# Patient Record
(demographics unavailable — no encounter records)

---

## 2025-03-16 NOTE — REVIEW OF SYSTEMS
[Negative] : Heme/Lymph [FreeTextEntry4] : Rhinitis, occasional snoring [FreeTextEntry8] : rare urinary leakage [FreeTextEntry9] : Right foot pain [de-identified] : Paresthesia LES- saw neurologist normal exam

## 2025-03-16 NOTE — PHYSICAL EXAM
[No Acute Distress] : no acute distress [Well Nourished] : well nourished [Well Developed] : well developed [Well-Appearing] : well-appearing [Normal Sclera/Conjunctiva] : normal sclera/conjunctiva [PERRL] : pupils equal round and reactive to light [EOMI] : extraocular movements intact [Normal Outer Ear/Nose] : the outer ears and nose were normal in appearance [Normal Oropharynx] : the oropharynx was normal [No JVD] : no jugular venous distention [No Lymphadenopathy] : no lymphadenopathy [Supple] : supple [Thyroid Normal, No Nodules] : the thyroid was normal and there were no nodules present [No Respiratory Distress] : no respiratory distress  [No Accessory Muscle Use] : no accessory muscle use [Clear to Auscultation] : lungs were clear to auscultation bilaterally [Normal Rate] : normal rate  [Regular Rhythm] : with a regular rhythm [Normal S1, S2] : normal S1 and S2 [No Murmur] : no murmur heard [No Carotid Bruits] : no carotid bruits [No Abdominal Bruit] : a ~M bruit was not heard ~T in the abdomen [Pedal Pulses Present] : the pedal pulses are present [No Varicosities] : no varicosities [No Edema] : there was no peripheral edema [No Palpable Aorta] : no palpable aorta [No Extremity Clubbing/Cyanosis] : no extremity clubbing/cyanosis [Non Tender] : non-tender [Soft] : abdomen soft [Non-distended] : non-distended [No Masses] : no abdominal mass palpated [No HSM] : no HSM [Normal Bowel Sounds] : normal bowel sounds [Normal Posterior Cervical Nodes] : no posterior cervical lymphadenopathy [Normal Anterior Cervical Nodes] : no anterior cervical lymphadenopathy [No CVA Tenderness] : no CVA  tenderness [No Spinal Tenderness] : no spinal tenderness [No Joint Swelling] : no joint swelling [Grossly Normal Strength/Tone] : grossly normal strength/tone [No Rash] : no rash [Coordination Grossly Intact] : coordination grossly intact [No Focal Deficits] : no focal deficits [Normal Gait] : normal gait [Normal Affect] : the affect was normal [Deep Tendon Reflexes (DTR)] : deep tendon reflexes were 2+ and symmetric [Normal Insight/Judgement] : insight and judgment were intact [de-identified] : Normotensive   BMI: 26.61   [de-identified] : Nasal rhinitis

## 2025-03-16 NOTE — HISTORY OF PRESENT ILLNESS
[FreeTextEntry1] : Annual Wellness examination [de-identified] : 49 yrs old female here for annual wellness examination.  New history : Fibroids -pelvic, recent ovarian cyst resolved. Patient went for genetic testing regarding FH renal cell carcinoma- father  at 62 yrs old.  Genetic test was negative. PSH: 1990s Tonsillectomy 2018 Myomectomy PMH: Bradycardia  bilateral breast biopsy negative for cancer Gallstones-states resolved Hx gluten sensitivity 12/15/23: Surrogate pregnancy - healthy baby daughter Allergies: NKDA Seasonal allergies Medication: No prescriptions Vitamins include: No vitamins SH: Nonsmoker, No marijuana. Alcohol social- wine at dinner almost daily FH: Mother: New- Alzheimer's dementia. 81.  Father  renal cell carcinoma 62 Paternal. GM breast cancer(65)  92 Nutrition: Diet; regular Exercise: Less this winter. Running- plan increase exercise- cardio  Preventive screening:   Breast imaging : 2024 U/S - Mammogram normal  Biopsy   left breast neg for cancer.  Biopsy  neg for cancer right breast. GYN/pelvic imagin2024 normal +Fibroids-small Cyst on ovary cleared.   Bone density: Not yet   CRC screenin colonoscopy- normal Dr Dent   Ophthalmology: Annual - normal +readers   Cardiac: EKG normal   Labwork: Today  Need for lung cancer screening: Smoker within 15 yrs of 20 +pack yrs/over 50: neg   Dermatology: 1 yrs ago skin check- no precancer   Dental: Every 3-6 mos   Fall risk: No    Advance directives:  not sure

## 2025-03-16 NOTE — HEALTH RISK ASSESSMENT
[Little interest or pleasure doing things] : 1) Little interest or pleasure doing things [Feeling down, depressed, or hopeless] : 2) Feeling down, depressed, or hopeless [0] : 2) Feeling down, depressed, or hopeless: Not at all (0) [PHQ-2 Negative - No further assessment needed] : PHQ-2 Negative - No further assessment needed [I have developed a follow-up plan documented below in the note.] : I have developed a follow-up plan documented below in the note. [Time Spent: ___ Minutes] : I spent [unfilled] minutes performing a depression screening for this patient. [[id number]] : 0

## 2025-03-16 NOTE — PHYSICAL EXAM
[No Acute Distress] : no acute distress [Well Nourished] : well nourished [Well Developed] : well developed [Well-Appearing] : well-appearing [Normal Sclera/Conjunctiva] : normal sclera/conjunctiva [PERRL] : pupils equal round and reactive to light [EOMI] : extraocular movements intact [Normal Outer Ear/Nose] : the outer ears and nose were normal in appearance [Normal Oropharynx] : the oropharynx was normal [No JVD] : no jugular venous distention [No Lymphadenopathy] : no lymphadenopathy [Supple] : supple [Thyroid Normal, No Nodules] : the thyroid was normal and there were no nodules present [No Respiratory Distress] : no respiratory distress  [No Accessory Muscle Use] : no accessory muscle use [Clear to Auscultation] : lungs were clear to auscultation bilaterally [Normal Rate] : normal rate  [Regular Rhythm] : with a regular rhythm [Normal S1, S2] : normal S1 and S2 [No Murmur] : no murmur heard [No Carotid Bruits] : no carotid bruits [No Abdominal Bruit] : a ~M bruit was not heard ~T in the abdomen [Pedal Pulses Present] : the pedal pulses are present [No Varicosities] : no varicosities [No Edema] : there was no peripheral edema [No Palpable Aorta] : no palpable aorta [No Extremity Clubbing/Cyanosis] : no extremity clubbing/cyanosis [Non Tender] : non-tender [Soft] : abdomen soft [Non-distended] : non-distended [No Masses] : no abdominal mass palpated [No HSM] : no HSM [Normal Bowel Sounds] : normal bowel sounds [Normal Posterior Cervical Nodes] : no posterior cervical lymphadenopathy [Normal Anterior Cervical Nodes] : no anterior cervical lymphadenopathy [No CVA Tenderness] : no CVA  tenderness [No Spinal Tenderness] : no spinal tenderness [No Joint Swelling] : no joint swelling [Grossly Normal Strength/Tone] : grossly normal strength/tone [No Rash] : no rash [Coordination Grossly Intact] : coordination grossly intact [No Focal Deficits] : no focal deficits [Normal Gait] : normal gait [Normal Affect] : the affect was normal [Deep Tendon Reflexes (DTR)] : deep tendon reflexes were 2+ and symmetric [Normal Insight/Judgement] : insight and judgment were intact [de-identified] : Normotensive   BMI: 26.61   [de-identified] : Nasal rhinitis

## 2025-03-16 NOTE — HEALTH RISK ASSESSMENT
[Little interest or pleasure doing things] : 1) Little interest or pleasure doing things [Feeling down, depressed, or hopeless] : 2) Feeling down, depressed, or hopeless [0] : 2) Feeling down, depressed, or hopeless: Not at all (0) [PHQ-2 Negative - No further assessment needed] : PHQ-2 Negative - No further assessment needed [I have developed a follow-up plan documented below in the note.] : I have developed a follow-up plan documented below in the note. [Time Spent: ___ Minutes] : I spent [unfilled] minutes performing a depression screening for this patient. [[id number]] : 0

## 2025-03-16 NOTE — HISTORY OF PRESENT ILLNESS
[FreeTextEntry1] : Annual Wellness examination [de-identified] : 49 yrs old female here for annual wellness examination.  New history : Fibroids -pelvic, recent ovarian cyst resolved. Patient went for genetic testing regarding FH renal cell carcinoma- father  at 62 yrs old.  Genetic test was negative. PSH: 1990s Tonsillectomy 2018 Myomectomy PMH: Bradycardia  bilateral breast biopsy negative for cancer Gallstones-states resolved Hx gluten sensitivity 12/15/23: Surrogate pregnancy - healthy baby daughter Allergies: NKDA Seasonal allergies Medication: No prescriptions Vitamins include: No vitamins SH: Nonsmoker, No marijuana. Alcohol social- wine at dinner almost daily FH: Mother: New- Alzheimer's dementia. 81.  Father  renal cell carcinoma 62 Paternal. GM breast cancer(65)  92 Nutrition: Diet; regular Exercise: Less this winter. Running- plan increase exercise- cardio  Preventive screening:   Breast imaging : 2024 U/S - Mammogram normal  Biopsy   left breast neg for cancer.  Biopsy  neg for cancer right breast. GYN/pelvic imagin2024 normal +Fibroids-small Cyst on ovary cleared.   Bone density: Not yet   CRC screenin colonoscopy- normal Dr Dent   Ophthalmology: Annual - normal +readers   Cardiac: EKG normal   Labwork: Today  Need for lung cancer screening: Smoker within 15 yrs of 20 +pack yrs/over 50: neg   Dermatology: 1 yrs ago skin check- no precancer   Dental: Every 3-6 mos   Fall risk: No    Advance directives:  not sure

## 2025-03-16 NOTE — PLAN
[FreeTextEntry1] : 49 yrs old female here for annual wellness examination. Patient complains of chronic rhinitis symptoms and snoring.   Recommend ENT consult- referral done. Patient had workup for Leg parestheia- with neurology and was negative Check B vitamins B12, B1 , Folic acid  Also check CBC, CMP, lipid, TFTs LFTs A1c, Mg CK, urine screen , vitamin levels. For right foot pain - recommend podiatry consult- referral done. Diet and exercise discussed with pt- healthy diet recommended. Patient plans to increase cardio- runner EKG shows sinus bradycardia WNL No medication or vitamins currently Preventive screening up to date Patient also did genetic testing for RCC which was negative RV 1 yrs Patient understands instructions and agrees [facility] plan WIll contact pt with results of all testing

## 2025-03-16 NOTE — PLAN
[FreeTextEntry1] : 49 yrs old female here for annual wellness examination. Patient complains of chronic rhinitis symptoms and snoring.   Recommend ENT consult- referral done. Patient had workup for Leg parestheia- with neurology and was negative Check B vitamins B12, B1 , Folic acid  Also check CBC, CMP, lipid, TFTs LFTs A1c, Mg CK, urine screen , vitamin levels. For right foot pain - recommend podiatry consult- referral done. Diet and exercise discussed with pt- healthy diet recommended. Patient plans to increase cardio- runner EKG shows sinus bradycardia WNL No medication or vitamins currently Preventive screening up to date Patient also did genetic testing for RCC which was negative RV 1 yrs Patient understands instructions and agrees [facility] plan WIll contact pt with results of all testing

## 2025-03-16 NOTE — REVIEW OF SYSTEMS
[Negative] : Heme/Lymph [FreeTextEntry4] : Rhinitis, occasional snoring [FreeTextEntry8] : rare urinary leakage [FreeTextEntry9] : Right foot pain [de-identified] : Paresthesia LES- saw neurologist normal exam

## 2025-06-19 NOTE — PLAN
[TextEntry] : We will start with a sleep study to rule out PRIETSH.  I will call her with the results of the PSG.   For now, she will start flonase BID.  BRS or nasal cones for nasal cognestion. Can consider vivaer or septum/turbs in the future.   I will call with PSG.

## 2025-06-19 NOTE — CONSULT LETTER
[Dear  ___] : Dear  [unfilled], [Consult Letter:] : I had the pleasure of evaluating your patient, [unfilled]. [Please see my note below.] : Please see my note below. [Consult Closing:] : Thank you very much for allowing me to participate in the care of this patient.  If you have any questions, please do not hesitate to contact me. [Sincerely,] : Sincerely, [FreeTextEntry3] : Scott Mayo MD, DINORA  Otolaryngology  Sinus and Endoscopic Skull Base Surgery  Head and Neck Surgery   [address], [address]  [place] [address]  Tel: [phone]  Fax:[phone]  ESTELA Galarza, PA-C Department of Otolaryngology [facility] Ot[facility] at [place] Phone: [phone] Fax: [phone]

## 2025-06-19 NOTE — HISTORY OF PRESENT ILLNESS
[de-identified] : 49F presenting with snoring and difficulty getting air through nose. Hx of tonsillectomy/adenoidectomy. She reports mouth breathing during sleep, worsening snoring over the past few years. Breathing through nose when she is awake. She has been using saline spray intermittently. Sense of smell not great. No witnessed apneic episodes per partner, has young children at home so not well rested. But feels as if she gets a full night of sleep. Hx of environmental allergies.

## 2025-06-19 NOTE — PROCEDURE
[Anterior rhinoscopy insufficient to account for symptoms] : anterior rhinoscopy insufficient to account for symptoms [None] : none [de-identified] : Scott Mayo [de-identified] : Nasal Endoscopy: Procedure: Bilateral nasal endoscopy (CPT 38521)   Indication: Anterior rhinoscopy was inadequate to evaluate pathology.  Left: Septum deviated R Moderate inferior turbinate hypertrophy  Middle meatus clear, without masses, polyps, or pus Sphenoethmoidal recess clear, without masses, polyps, or pus  Right:  Septum deviated R Moderate inferior turbinate hypertrophy Middle meatus clear, without masses, polyps, or pus  Sphenoethmoidal recess clear, without masses, polyps, or pus  [de-identified] : Laryngoscopy: NPL: Nasopharynx clear without masses Prominent BOT, symmetric. Narrow AP diameter at BOT, unchanged with jaw thrust or tongue protrusion Vallecula clear Pyriforms clear Epiglottis crisp TVFs mobile bilaterally  Arytenoids normal Glottic airway patent

## 2025-06-19 NOTE — REASON FOR VISIT
[Initial Consultation] : an initial consultation for [FreeTextEntry2] : difficulty breathing through nose